# Patient Record
Sex: FEMALE | Race: WHITE | HISPANIC OR LATINO | Employment: FULL TIME | URBAN - METROPOLITAN AREA
[De-identification: names, ages, dates, MRNs, and addresses within clinical notes are randomized per-mention and may not be internally consistent; named-entity substitution may affect disease eponyms.]

---
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
## 2022-04-01 ENCOUNTER — TELEPHONE (OUTPATIENT)
Dept: OBSTETRICS AND GYNECOLOGY | Facility: CLINIC | Age: 22
End: 2022-04-01
Payer: OTHER GOVERNMENT

## 2022-04-01 NOTE — TELEPHONE ENCOUNTER
No involvement of care in chart to speak with mother.      Spoke with pt, states she realized she was a day behind on her OCP but doubled up when she realized it.  Not sure how long she was behind taking the pill.  Has missed pills and needed to double up previously with no BTB. She had a period but continued to spot after starting next pill pack.  About to start period.  Not sexually active in the past several months.  Recently increased anxiety medication.  Reassured her it is probably due to missed pill or increased anxiety.  Recommended she take sugar pills to allow a monthly period, bleeding should stop once she starts next pill pack but if BTB continues call back.